# Patient Record
Sex: FEMALE | Race: BLACK OR AFRICAN AMERICAN | Employment: OTHER | URBAN - METROPOLITAN AREA
[De-identification: names, ages, dates, MRNs, and addresses within clinical notes are randomized per-mention and may not be internally consistent; named-entity substitution may affect disease eponyms.]

---

## 2019-03-20 ENCOUNTER — APPOINTMENT (OUTPATIENT)
Dept: PHYSICAL THERAPY | Age: 69
End: 2019-03-20

## 2019-04-01 ENCOUNTER — HOSPITAL ENCOUNTER (OUTPATIENT)
Dept: PHYSICAL THERAPY | Age: 69
Discharge: HOME | End: 2019-04-01
Payer: MEDICARE

## 2019-04-01 PROCEDURE — 97162 PT EVAL MOD COMPLEX 30 MIN: CPT

## 2019-04-01 PROCEDURE — 97110 THERAPEUTIC EXERCISES: CPT

## 2019-04-01 NOTE — PROGRESS NOTES
[address] PHYSICAL THERAPY 
[address] [place] [address] 51, [address] 201,Jessica Foley, [address] - Phone: [phone]  Fax: [phone] PLAN OF CARE / STATEMENT OF MEDICAL NECESSITY FOR PHYSICAL THERAPY SERVICES Patient Name: Stephanie Burks : 1950 Medical  
Diagnosis: LBP, Spondylolisthesis, Stenosis, Arthrodesis Treatment Diagnosis: Low back pain [M54.5] Onset Date: Chronic, Increase 2-3 months prior to PT eval    
Referral Source: Winnie Bass MD Start of Care [facility]): 2019 Prior Hospitalization: See medical history Provider #: [id number] Prior Level of Function: Complete prolonged amb >15 minutes, lie supine, sit without back support and carry/lift objects with increased efficiency Comorbidities: Fall Risk, L/S Fusion , L RCR , Hysterectomy , R Wrist Surgery , Trigger finger , B Bunionectomy , HTN, Depression, Asthma Medications: Verified on Patient Summary List  
The Plan of Care and following information is based on the information from the initial evaluation.  
=========================================================================================== Assessment / key information:  Pt is a 69 y/o female reporting chronic LBP that started  while in the [facility]. Pt reports turning quickly and experiencing LBP. 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 Pt reports in  she had l/s fusion with ana placement. Pt started going to a chiropractor from 2018 through 2018, but experienced increased pain with manipulations to l/s, so she stopped. Pt saw MD 2-3 months ago due to increased back pain and was told she has a disc issue above her fusion. Pt reports pain levels range 5-10/10.  Pain increases with amb > 15 minutes, lying in supine with legs extended, sitting without back support, and lifting/carrying objects; decreases with exercising in pool and meds. Pt reports constant LE radicular pain L>R LE from glute/hips to toes (varying in intensity). Pt reports constant numbness in B ankles and feet. Pt reports legs intermittently feel like they are going to give out (approximately 2x/week), but she hasn't fallen. Pt does not want to use an A device with amb. Pt denies groin pain, falls or red flags. Pt reports she is currently under a lifting restriction of approximately 10 lbs. Pt demonstrates decreased trunk rotation/arm swing with amb and L shoulder elevated, (+) B Trendelenberg,  B LE sensation intact and symmetrical to light touch, (-) SLR, 90/90 SLR and Homans, l/s AROM WFL s/p fusion, decreased B hip ABD/ext/glute strength, 4+/5 B quad strength, 4/5 B HS strength, decreased core strength/stability, poor body mechanics with transfers, TTP B l/s paraspinals, lower t/s paraspinals, glute med/max, piriformis, QL, TFL and proximately 1/3 ITB, even innominates, hip AROM WFL, good scar mobility, and decreased functional mobility. FOTO Score: 39/100 Pt's PT script indicated aquatic PT. Pt informed prior to PT eval that she would not be able to complete aquatic PT at this facility due to insurance restrictions. Another PT location was offered to patient, where she could complete aquatic PT. Pt requested to stay at [facility]. and complete land based PT. 
=========================================================================================== Eval Complexity: History MEDIUM  Complexity : 1-2 comorbidities / personal factors will impact the outcome/ POC ;  Examination  HIGH Complexity : 4+ Standardized tests and measures addressing body structure, function, activity limitation and / or participation in recreation ; Presentation MEDIUM Complexity : Evolving with changing characteristics ;   Decision Making MEDIUM Complexity : FOTO score of 26-74; Overall Complexity MEDIUM Problem List: pain affecting function, decrease strength, impaired gait/ balance, decrease ADL/ functional abilitiies, decrease activity tolerance, decrease flexibility/ joint mobility and decrease transfer abilities Treatment Plan may include any combination of the following: Therapeutic exercise, Therapeutic activities, Neuromuscular re-education, Physical agent/modality, Gait/balance training, Manual therapy, Patient education, Self Care training, Functional mobility training, Home safety training and Stair training Patient / Family readiness to learn indicated by: asking questions, trying to perform skills and interest 
Persons(s) to be included in education: patient (P) Barriers to Learning/Limitations: None Measures taken, if barriers to learning:   
Patient Goal (s): \"Pain relief and what exercises that can relieve some pain\" Patient self reported health status: good Rehabilitation Potential: good ? Short Term Goals: To be accomplished in  2  weeks: 1. Pt to demonstrate independence with HEP to improve core/glute/hip strength/stability for prolonged amb and carrying/lifting objects. 2. Pt to demonstrate correct body mechanics with supine to sit and sit to stand transfers to protect l/s and improve safety and efficiency of transfers at home. ? Long Term Goals: To be accomplished in  4-6  weeks: 1. Pt to report 6 point or > improvement on FOTO scores to improve functional mobility for prolonged amb and carrying/lifting objects. 2. Pt to demonstrate 4+/5 or > B hip ext/ABD strength to improve stability for prolonged amb. 3. Pt to report +5 or > on GROC to improve functional mobility for prolonged amb and carrying/lifting objects. Frequency / Duration:   Patient to be seen  2  times per week for 4-6  weeks: 
Patient / Caregiver education and instruction: self care, activity modification and exercises Therapist Signature: Rivera AlfredoLINDSEY Date: 4/1/2019 Certification Period: 4/1/19 to 6/25/19 Time: 9:59 AM  
=========================================================================================== I certify that the above Physical Therapy Services are being furnished while the patient is under my care. I agree with the treatment plan and certify that this therapy is necessary. Physician Signature:       Date:      Time:  Please sign and return to [facility] [place]. or you may fax the signed copy to [phone]. Thank you.

## 2019-04-01 NOTE — PROGRESS NOTES
PHYSICAL THERAPY - DAILY TREATMENT NOTE Patient Name: Deborah Muñoz        Date: 2019 : 1950   yes Patient  Verified Visit #:      12  Insurance: Payor: Joseph Solders / Plan: Meghan Apple / Product Type: Medicare / In time: 9:00 Out time: 9:59 Total Treatment Time: 61 Medicare/BCBS Time Tracking (below) Total Timed Codes (min):  15 1:1 Treatment Time:  61 TREATMENT AREA =  Low back pain [M54.5] SUBJECTIVE Pain Level (on 0 to 10 scale):  8  / 10 Medication Changes/New allergies or changes in medical history, any new surgeries or procedures?    no  If yes, update Summary List  
Subjective Functional Status/Changes:  []  No changes reported See initial eval  
 
  
 
OBJECTIVE 15 min Therapeutic Exercise:  [x]  See flow sheet Rationale:      increase strength to improve the patients ability to complete amb and carry/lift objects. Billed With/As: 
 [x] TE 
 [] TA 
 [] Neuro 
 [] Self Care Patient Education: [x] Review HEP [] Progressed/Changed HEP based on:  
[] positioning   [] body mechanics   [] transfers   [] heat/ice application   
[x] other: Pt instructed on and given HEP to be completed daily. Pt instructed on proper body mechanics with transfers. Pt educated on red flags and to seek immediate medical attention/911 if those occur. Reviewed POC and goals. Pt reports understanding. Other Objective/Functional Measures: 
 
See initial eval 
  
Post Treatment Pain Level (on 0 to 10) scale:   0  / 10 ASSESSMENT Assessment/Changes in Function:  
 
See initial eval 
  
[]  See Progress Note/Recertification Patient will continue to benefit from skilled PT services to see initial eval  
Progress toward goals / Updated goals: 
Pt denied sharp pains or red flags with initial eval or therapeutic ex. Pt denied pain at end of session. See initial eval.  
 
PLAN [x]  Upgrade activities as tolerated yes Continue plan of care  
[]  Discharge due to :   
 [x]  Other: PT 2x/week for 4-6 weeks. Pt to complete land based PT (pt informed prior to PT eval that we could not complete aquatic PT at our facility due to her insurance. PT offered pt going to [facility] as an option, but pt refused, stating she wanted to complete land based PT at this facility. Therapist: Pedrito Mallory, PT Date: 4/1/2019 Time: 9:59 AM  
 
Future Appointments Date Time Provider Adriana Landa 4/5/2019  8:30 AM Michael Tam, PT [facility]  
4/11/2019  7:30 AM Kristopher Cordoba, PT [facility]  
4/16/2019  9:30 AM Michael Tam, PT [facility]  
4/19/2019  7:30 AM Michael Tam, PT [facility]  
4/23/2019  7:30 AM Michael Tam, PT [facility]  
4/25/2019  8:00 AM Michael Tam, PT [facility]  
4/30/2019  8:30 AM Martita Haines, PT [facility]

## 2019-04-05 ENCOUNTER — HOSPITAL ENCOUNTER (OUTPATIENT)
Dept: PHYSICAL THERAPY | Age: 69
Discharge: HOME | End: 2019-04-05
Payer: MEDICARE

## 2019-04-05 PROCEDURE — 97110 THERAPEUTIC EXERCISES: CPT

## 2019-04-05 PROCEDURE — 97140 MANUAL THERAPY 1/> REGIONS: CPT

## 2019-04-05 NOTE — PROGRESS NOTES
PHYSICAL THERAPY - DAILY TREATMENT NOTE Patient Name: Viry Carey        Date: 2019 : 1950   yes Patient  Verified Visit #:     Insurance: Payor: Karlie Faria / Plan: [place] MEDICARE PART A & B / Product Type: Medicare / In time: 832 Out time: 547 Total Treatment Time: 50 Medicare/BCBS Time Tracking (below) Total Timed Codes (min):  40 1:1 Treatment Time:  30 TREATMENT AREA =  Low back pain [M54.5] SUBJECTIVE Pain Level (on 0 to 10 scale):  3  / 10 Medication Changes/New allergies or changes in medical history, any new surgeries or procedures?    no  If yes, update Summary List  
Subjective Functional Status/Changes:  []  No changes reported Patient reports she was compliant with her home exercises, but felt like these exercises increased her pain in her lower back as well as down both legs. Patient was unsure if she should continue doing them OBJECTIVE Modalities Rationale:     decrease pain and increase tissue extensibility to improve patient's ability to perform transfers. min [] Estim, type/location:   
                                 []  att     []  unatt     []  w/US     []  w/ice    []  w/heat 
 min []  Mechanical Traction: type/lbs   
               []  pro   []  sup   []  int   []  cont    []  before manual    []  after manual  
 min []  Ultrasound, settings/location:    
 min []  Iontophoresis w/ dexamethasone, location:   
                                           []  take home patch       []  in clinic  
10 min []  Ice     [x]  Heat    location/position: To lumbar spine in supine with bolster  
 min []  Vasopneumatic Device, press/temp:   
 min []  Other:   
[x] Skin assessment post-treatment (if applicable):   
[x]  intact    []  redness- no adverse reaction    
[]redness  adverse reaction:     
20 min Therapeutic Exercise:  [x]  See flow sheet Rationale:      increase ROM and increase strength to improve the patients ability to perform walking activities. 20 min Manual Therapy: STM to R QL, lumbar paraspinals and glute med in L SL; STM to L QL, lumbar paraspinals and glue med in R SL  
Rationale:      decrease pain, increase ROM, increase tissue extensibility and decrease trigger points to improve patient's ability to perform standing activities. Billed With/As: 
 [x] TE 
 [] TA 
 [] Neuro 
 [] Self Care Patient Education: [x] Review HEP [] Progressed/Changed HEP based on:  
[] positioning   [] body mechanics   [] transfers   [] heat/ice application   
[] other:   
Other Objective/Functional Measures: 
 
1:1 TE 10' Performed MT in SL positions as patient stated she was unable to lay prone Added SL hip abduction this session, otherwise kept exercises which were part of HEP and did not progress secondary to patient reports of elevated pain Patient required cuing Post Treatment Pain Level (on 0 to 10) scale:   2-3  / 10 ASSESSMENT Assessment/Changes in Function:  
 
Patient denied changes in symptoms post session. Educated on rationale behind elevated symptoms with performance of new exercises, but that her pain should not significantly increase with her current program. Patient acknowledged understanding. []  See Progress Note/Recertification Patient will continue to benefit from skilled PT services to modify and progress therapeutic interventions, address functional mobility deficits, address ROM deficits, address strength deficits, analyze and address soft tissue restrictions, analyze and cue movement patterns, analyze and modify body mechanics/ergonomics and assess and modify postural abnormalities to attain remaining goals. Progress toward goals / Updated goals: 
Progressing with STG#1-reports performance of HEP since her IE  
 
PLAN [x]  Upgrade activities as tolerated yes Continue plan of care  
[]  Discharge due to :   
[]  Other:   
 
Therapist: Debbie Vazquez, PT   
 Date: 4/5/2019 Time: 9:33 AM  
 
Future Appointments Date Time Provider Adriana Landa 4/11/2019  7:30 AM Oneida Cordoba, PT [facility]  
4/16/2019  9:30 AM Angelia Vasquez, PT [facility]  
4/19/2019  7:30 AM Angelia Vasquez, PT [facility]  
4/23/2019  7:30 AM Angelia Vasquez, PT [facility]  
4/25/2019  8:00 AM Angelia Vasquez, PT [facility]  
4/30/2019  8:30 AM Minnie Haines, PT [facility]

## 2019-04-12 ENCOUNTER — HOSPITAL ENCOUNTER (OUTPATIENT)
Dept: PHYSICAL THERAPY | Age: 69
Discharge: HOME | End: 2019-04-12
Payer: MEDICARE

## 2019-04-12 PROCEDURE — 97110 THERAPEUTIC EXERCISES: CPT

## 2019-04-12 PROCEDURE — 97140 MANUAL THERAPY 1/> REGIONS: CPT

## 2019-04-12 NOTE — PROGRESS NOTES
PHYSICAL THERAPY - DAILY TREATMENT NOTE Patient Name: Rachel Solomon        Date: 2019 : 1950   yes Patient  Verified Visit #:   3   of   12  Insurance: Payor: Shady Briscoe / Plan: Meghan Apple / Product Type: Medicare / In time: 804 Out time: 900 Total Treatment Time: 64 Medicare/BCBS Time Tracking (below) Total Timed Codes (min):  46 1:1 Treatment Time:  30 TREATMENT AREA =  Low back pain [M54.5] SUBJECTIVE Pain Level (on 0 to 10 scale):  7  / 10 Medication Changes/New allergies or changes in medical history, any new surgeries or procedures?    no  If yes, update Summary List  
Subjective Functional Status/Changes:  []  No changes reported Patient reports she missed yesterday's appointment because she thought it was today. Patient continues to experience pain into her lumbar spine as well as into both hips on a regular basis. OBJECTIVE Modalities Rationale:     decrease pain and increase tissue extensibility to improve patient's ability to perform transfers. min [] Estim, type/location:   
                                 []  att     []  unatt     []  w/US     []  w/ice    []  w/heat 
 min []  Mechanical Traction: type/lbs   
               []  pro   []  sup   []  int   []  cont    []  before manual    []  after manual  
 min []  Ultrasound, settings/location:    
 min []  Iontophoresis w/ dexamethasone, location:   
                                           []  take home patch       []  in clinic  
10 min []  Ice     [x]  Heat    location/position: To lumbar spine in supine with bolster  
 min []  Vasopneumatic Device, press/temp:   
 min []  Other:   
[x] Skin assessment post-treatment (if applicable):   
[x]  intact    []  redness- no adverse reaction    
[]redness  adverse reaction:     
26 min Therapeutic Exercise:  [x]  See flow sheet Rationale:      increase ROM and increase strength to improve the patients ability to perform walking activities. 20 min Manual Therapy: STM to R QL, lumbar paraspinals and glute med in L SL; STM to L QL, lumbar paraspinals and glue med in R SL  
Rationale:      decrease pain, increase ROM, increase tissue extensibility and decrease trigger points to improve patient's ability to perform standing activities. Billed With/As: 
 [x] TE 
 [] TA 
 [] Neuro 
 [] Self Care Patient Education: [x] Review HEP [] Progressed/Changed HEP based on:  
[] positioning   [] body mechanics   [] transfers   [] heat/ice application   
[] other:   
Other Objective/Functional Measures: 
 
1:1 TE 10' Tenderness noted to R>L QL during MT with referral of pain down R thigh with palpation of R QL Added t-band rows this session for improved core and upper thoracic strength and stability Post Treatment Pain Level (on 0 to 10) scale:   7 / 10 ASSESSMENT Assessment/Changes in Function:  
 
Patient denied changes in symptoms post session. Patient educated to continue with HEP as prescribed, will progress at NV depending on patient symptoms level at start of session. []  See Progress Note/Recertification Patient will continue to benefit from skilled PT services to modify and progress therapeutic interventions, address functional mobility deficits, address ROM deficits, address strength deficits, analyze and address soft tissue restrictions, analyze and cue movement patterns, analyze and modify body mechanics/ergonomics and assess and modify postural abnormalities to attain remaining goals. Progress toward goals / Updated goals: 
Slow progress with LTG#2- strength gains are reduced based upon attendance at this time. PLAN [x]  Upgrade activities as tolerated yes Continue plan of care  
[]  Discharge due to :   
[]  Other:   
 
Therapist: Jose Gillespie PT Date: 4/12/2019 Time: 9:39 AM  
 
Future Appointments Date Time Provider Adriana Landa 4/23/2019  7:30 AM Brayden Valdez, PT [facility]  
4/25/2019  8:00 AM Brayden Valdez, PT [facility]  
4/30/2019  8:30 AM Omer Haines, PT [facility]

## 2019-04-16 ENCOUNTER — APPOINTMENT (OUTPATIENT)
Dept: PHYSICAL THERAPY | Age: 69
End: 2019-04-16
Payer: MEDICARE

## 2019-04-19 ENCOUNTER — APPOINTMENT (OUTPATIENT)
Dept: PHYSICAL THERAPY | Age: 69
End: 2019-04-19
Payer: MEDICARE

## 2019-04-23 ENCOUNTER — APPOINTMENT (OUTPATIENT)
Dept: PHYSICAL THERAPY | Age: 69
End: 2019-04-23
Payer: MEDICARE

## 2019-04-25 ENCOUNTER — HOSPITAL ENCOUNTER (OUTPATIENT)
Dept: PHYSICAL THERAPY | Age: 69
Discharge: HOME | End: 2019-04-25
Payer: MEDICARE

## 2019-04-25 PROCEDURE — 97140 MANUAL THERAPY 1/> REGIONS: CPT

## 2019-04-25 PROCEDURE — 97110 THERAPEUTIC EXERCISES: CPT

## 2019-04-25 NOTE — PROGRESS NOTES
PHYSICAL THERAPY - DAILY TREATMENT NOTE Patient Name: Leonel Stephenson        Date: 2019 : 1950   yes Patient  Verified Visit #:     Insurance: Payor: Magdi Honey / Plan: Meghan Apple / Product Type: Medicare / In time: 805 Out time: 264 Total Treatment Time: 47 Medicare/[facility] Time Tracking (below) Total Timed Codes (min):  44 1:1 Treatment Time:  45 TREATMENT AREA =  Low back pain [M54.5] SUBJECTIVE Pain Level (on 0 to 10 scale):  1  / 10 Medication Changes/New allergies or changes in medical history, any new surgeries or procedures?    no  If yes, update Summary List  
Subjective Functional Status/Changes:  []  No changes reported Patient arrives to clinic with a lower back brace asking if it is something she should be using. Patient states she is doing better today than normal and isn't feeling too much pain in her lower back, just discomfort into her hips. OBJECTIVE Modalities Rationale:     decrease pain and increase tissue extensibility to improve patient's ability to perform transfers. min [] Estim, type/location:   
                                 []  att     []  unatt     []  w/US     []  w/ice    []  w/heat 
 min []  Mechanical Traction: type/lbs   
               []  pro   []  sup   []  int   []  cont    []  before manual    []  after manual  
 min []  Ultrasound, settings/location:    
 min []  Iontophoresis w/ dexamethasone, location:   
                                           []  take home patch       []  in clinic  
10 min []  Ice     [x]  Heat    location/position: To lumbar spine in supine with bolster  
 min []  Vasopneumatic Device, press/temp:   
 min []  Other:   
[x] Skin assessment post-treatment (if applicable):   
[x]  intact    []  redness- no adverse reaction    
[]redness  adverse reaction:     
39 min Therapeutic Exercise:  [x]  See flow sheet Rationale:      increase ROM and increase strength to improve the patients ability to perform walking activities. 15 min Manual Therapy: STM to B glute max and B piriformis in prone Rationale:      decrease pain, increase ROM, increase tissue extensibility and decrease trigger points to improve patient's ability to perform standing activities. Billed With/As: 
 [x] TE 
 [] TA 
 [] Neuro 
 [] Self Care Patient Education: [x] Review HEP [] Progressed/Changed HEP based on:  
[] positioning   [] body mechanics   [] transfers   [] heat/ice application   
[] other:   
Other Objective/Functional Measures: 
 
1:1 TE 21' Added several standing TE this session for improved core and hip strength/stability Patient requiring cuing and supervision throughout session in order to ensure proper technique Patient reporting difficulty with most TE despite general low level of difficulty, in particular table exercises Post Treatment Pain Level (on 0 to 10) scale:   7-8  / 10 ASSESSMENT Assessment/Changes in Function:  
 
Patient reported a significant increase in pain levels post session, however overall presentation did not much these pain levels. Progress is slow at this time secondary to patient lack of attendance since her IE as well as likely reduced compliance with HEP. Educated patient on proper lumbar brace use and importance of performing exercise program in order to improve core strength. []  See Progress Note/Recertification Patient will continue to benefit from skilled PT services to modify and progress therapeutic interventions, address functional mobility deficits, address ROM deficits, address strength deficits, analyze and address soft tissue restrictions, analyze and cue movement patterns, analyze and modify body mechanics/ergonomics and assess and modify postural abnormalities to attain remaining goals.  
Progress toward goals / Updated goals: 
Slow progress with compliance to HEP  
 
PLAN 
 [x]  Upgrade activities as tolerated yes Continue plan of care  
[]  Discharge due to :   
[]  Other:   
 
Therapist: Gabi Antony, PT Date: 4/25/2019 Time: 9:03 AM  
 
Future Appointments Date Time Provider Adriana Landa 4/30/2019  8:30 AM Candy Haines, PT [facility]

## 2019-04-30 ENCOUNTER — HOSPITAL ENCOUNTER (OUTPATIENT)
Dept: PHYSICAL THERAPY | Age: 69
Discharge: HOME | End: 2019-04-30
Payer: MEDICARE

## 2019-04-30 PROCEDURE — 97140 MANUAL THERAPY 1/> REGIONS: CPT

## 2019-04-30 PROCEDURE — 97110 THERAPEUTIC EXERCISES: CPT

## 2019-04-30 NOTE — PROGRESS NOTES
PHYSICAL THERAPY - DAILY TREATMENT NOTE Patient Name: Tad Jimenez        Date: 2019 : 1950   yes Patient  Verified Visit #:     Insurance: Payor: Justa Wurl / Plan: Meghan Apple / Product Type: Medicare / In time: 8:30 Out time: 9:22 Total Treatment Time: 46 Medicare/BCBS Time Tracking (below) Total Timed Codes (min):  42 1:1 Treatment Time:  32 TREATMENT AREA =  Low back pain [M54.5] SUBJECTIVE Pain Level (on 0 to 10 scale):  8  / 10 Medication Changes/New allergies or changes in medical history, any new surgeries or procedures?    no  If yes, update Summary List  
Subjective Functional Status/Changes:  []  No changes reported Pt reports inc lower back pain as a result of doing household chores and grocery shopping this weekend. Reports her pain radiates down the back of both thighs and into the feet occasionally. OBJECTIVE Modalities Rationale:     decrease pain and increase tissue extensibility to improve patient's ability to complete ADLs 
 min [] Estim, type/location:   
                                 []  att     []  unatt     []  w/US     []  w/ice    []  w/heat 
 min []  Mechanical Traction: type/lbs   
               []  pro   []  sup   []  int   []  cont    []  before manual    []  after manual  
 min []  Ultrasound, settings/location:    
 min []  Iontophoresis w/ dexamethasone, location:   
                                           []  take home patch       []  in clinic  
10 min []  Ice     [x]  Heat    location/position: To l/s in supine w/ bolster  
 min []  Vasopneumatic Device, press/temp:   
 min []  Other:   
[x] Skin assessment post-treatment (if applicable):   
[x]  intact    []  redness- no adverse reaction    
[]redness  adverse reaction:     
27 min Therapeutic Exercise:  [x]  See flow sheet Rationale:      increase ROM and increase strength to improve the patients ability to ambulate, transfer 15 min Manual Therapy: STM to B glute max and piriformis, gentle sacral ext mob Rationale:      decrease pain, increase ROM, increase tissue extensibility and decrease trigger points to improve patient's ability to transfer Billed With/As: 
 [x] TE 
 [] TA 
 [] Neuro 
 [] Self Care Patient Education: [x] Review HEP [] Progressed/Changed HEP based on:  
[] positioning   [] body mechanics   [] transfers   [] heat/ice application   
[] other:   
Other Objective/Functional Measures: 
 
12' 1:1 TE Pt requiring cueing for appropriate core contraction w/ standing ex Significant ttp to B piri and glute max Dec strength R>L glute med Post Treatment Pain Level (on 0 to 10) scale:   2  / 10 ASSESSMENT Assessment/Changes in Function:  
 
Pt inquired to what exercises to do at gym. Advised cardio on recumbent bike and swimming only at this time, w/ focus on HEP rx by PT 
  
[]  See Progress Note/Recertification Patient will continue to benefit from skilled PT services to modify and progress therapeutic interventions, address functional mobility deficits, address ROM deficits, address strength deficits, analyze and address soft tissue restrictions, analyze and cue movement patterns, analyze and modify body mechanics/ergonomics, assess and modify postural abnormalities and instruct in home and community integration to attain remaining goals. Progress toward goals / Updated goals: 
Pt reporting only partial compliance to HEP, slow progress to STG #1 PLAN 
[]  Upgrade activities as tolerated yes Continue plan of care  
[]  Discharge due to :   
[]  Other:   
 
Therapist: Patricio Ontiveros PT Date: 4/30/2019 Time: 9:04 AM  
 
No future appointments.

## 2019-05-06 ENCOUNTER — HOSPITAL ENCOUNTER (OUTPATIENT)
Dept: PHYSICAL THERAPY | Age: 69
Discharge: HOME | End: 2019-05-06
Payer: MEDICARE

## 2019-05-06 PROCEDURE — 97110 THERAPEUTIC EXERCISES: CPT

## 2019-05-06 PROCEDURE — 97140 MANUAL THERAPY 1/> REGIONS: CPT

## 2019-05-06 NOTE — PROGRESS NOTES
[facility] PHYSICAL THERAPY 
[address] [place] [address] [address], [address],Jessica Foley, [address] [facility] - Phone: [phone]  Fax: [phone] CONTINUED PLAN OF CARE/RECERTIFICATION FOR PHYSICAL THERAPY Patient Name: Jeff Dash : 1950 Treatment/Medical Diagnosis: Low back pain [M54.5] Onset Date: Chronic, Increase 2-3 months prior to PT eval   
Referral Source: Cameron Leyva MD Start of Care [facility]): 19 Prior Hospitalization: See Medical History Provider #: [id number] Prior Level of Function: Complete prolonged amb >15 minutes, lie supine, sit without back support and carry/lift objects with increased efficiency Comorbidities: Fall Risk, L/S Fusion , L RCR , Hysterectomy , R Wrist Surgery , Trigger finger , B Bunionectomy , HTN, Depression, Asthma Medications: Verified on Patient Summary List  
Visits from [facility]: 6 Missed Visits: 2 Goal/Measure of Progress Goal Met? 1.  Pt to report 6 point or > improvement on FOTO scores to improve functional mobility for prolonged amb and carrying/lifting objects Status at last Eval: 39/100 Current Status: 58/100 yes 2. Pt to demonstrate 4+/5 or > B hip ext/ABD strength to improve stability for prolonged amb Status at last Eval: decreased B hip ABD/ext/glute strength Current Status: B hip ext 3-/5, L hip abd 4/5, R hip abd 4-/5 (P!) Slow progress 3. Pt to report +5 or > on GROC to improve functional mobility for prolonged amb and carrying/lifting objects Status at last Eval: n/a Current Status: +4 progressing Key Functional Changes/Progress: Patient reports slight changes in symptoms since the start of PT. Patient states she has noticed some pain reduction into her hips and lower back, however continues to note difficulty with bowling, prolonged walking. Patient rates her pain at worst as a 5/10 and on average 2-3/10.  Additionally, patient continues to note numbness into both feet. Patient did miss about 1/5-2 weeks of PT due to a death in the family, which has contributed to lack of additional progress at this time. Patient would benefit from continued PT in order to further address impairments and help improve tolerance to walking, standing and recreational activities. Problem List: pain affecting function, decrease ROM, decrease strength, impaired gait/ balance, decrease ADL/ functional abilitiies, decrease activity tolerance and decrease flexibility/ joint mobility Treatment Plan may include any combination of the following: Therapeutic exercise, Therapeutic activities, Neuromuscular re-education, Physical agent/modality, Gait/balance training, Manual therapy, Patient education and Functional mobility training Patient Goal(s) has been updated and includes:    
? Goals for this certification period include and are to be achieved in   4  weeks: 1. Patient will demonstrate independence with advanced HEP in preparation for DC. 2. Continue with LTG#2 3. Continue with LTG#3 Frequency / Duration:   Patient to be seen   2   times per week for   4    weeks: 
G-Codes (GP): na 
Assessments/Recommendations: Patient would benefit from continued PT 2x/week for 4 weeks in order to address remaining impairments and functional limitations. If you have any questions/comments please contact us directly at [phone]. Thank you for allowing us to assist in the care of your patient. Therapist Signature: Delia Lerner PT Date: 5/6/2019 Certification Period: 
Reporting Period: 4/1/19-6/25/19 4/1/19-5/6/19 Time: 9:01 AM  
NOTE TO PHYSICIAN:  PLEASE COMPLETE THE ORDERS BELOW AND FAX TO [facility] Physical Therapy: [phone] If you are unable to process this request in 24 hours please contact our office: [phone] 
 
___ I have read the above report and request that my patient continue as recommended. ___ I have read the above report and request that my patient continue therapy with the following changes/special instructions: ________________________________________________  
___ I have read the above report and request that my patient be discharged from therapy.   
 
Physician Signature:       Date:      Time:

## 2019-05-06 NOTE — PROGRESS NOTES
PHYSICAL THERAPY - DAILY TREATMENT NOTE Patient Name: Kim Marie        Date: 2019 : 1950   yes Patient  Verified Visit #:   6   of   12(+8)  Insurance: Payor: [place] [employer] / Plan: [place] MEDICARE PART A & B / Product Type: Medicare / In time: 855 Out time: 1001 Total Treatment Time: 77 Medicare/BCBS Time Tracking (below) Total Timed Codes (min):  56 1:1 Treatment Time:  40 TREATMENT AREA =  Low back pain [M54.5] SUBJECTIVE Pain Level (on 0 to 10 scale):  1  / 10 Medication Changes/New allergies or changes in medical history, any new surgeries or procedures?    no  If yes, update Summary List  
Subjective Functional Status/Changes:  []  No changes reported See PN OBJECTIVE Modalities Rationale:     decrease pain and increase tissue extensibility to improve patient's ability to perform transfers. min [] Estim, type/location:   
                                 []  att     []  unatt     []  w/US     []  w/ice    []  w/heat 
 min []  Mechanical Traction: type/lbs   
               []  pro   []  sup   []  int   []  cont    []  before manual    []  after manual  
 min []  Ultrasound, settings/location:    
 min []  Iontophoresis w/ dexamethasone, location:   
                                           []  take home patch       []  in clinic  
10 min []  Ice     [x]  Heat    location/position: To lumbar spine in supine with bolster  
 min []  Vasopneumatic Device, press/temp:   
 min []  Other:   
[x] Skin assessment post-treatment (if applicable):   
[x]  intact    []  redness- no adverse reaction    
[]redness  adverse reaction:     
46 min Therapeutic Exercise:  [x]  See flow sheet Rationale:      increase ROM and increase strength to improve the patients ability to perform walking activities.    
10 min Manual Therapy: STM to R glute med, R piriformis, R TFL all in L SL  
Rationale:      decrease pain, increase ROM, increase tissue extensibility and decrease trigger points to improve patient's ability to perform standing activities. Billed With/As: 
 [] TE 
 [] TA 
 [] Neuro 
 [] Self Care Patient Education: [x] Review HEP [] Progressed/Changed HEP based on:  
[] positioning   [] body mechanics   [] transfers   [] heat/ice application   
[] other:   
Other Objective/Functional Measures: 
 
1:1 TE 30' See PN Post Treatment Pain Level (on 0 to 10) scale:   2  / 10 ASSESSMENT Assessment/Changes in Function:  
 
See PN 
  
 
PLAN [x]  Upgrade activities as tolerated yes Continue plan of care  
[]  Discharge due to :   
[]  Other:   
 
Therapist: Rona Trejo PT Date: 5/6/2019 Time: 10:09 AM  
 
Future Appointments Date Time Provider Adriana Landa 5/8/2019  9:30 AM Delicia Stephenson PT [facility]  
5/14/2019  8:30 AM Thalia Quinonez, PT [facility]  
5/16/2019 10:00 AM Delicia Stephenson PT [facility]  
5/23/2019  9:30 AM Delicia Stephenson PT [facility]

## 2019-05-08 ENCOUNTER — HOSPITAL ENCOUNTER (OUTPATIENT)
Dept: PHYSICAL THERAPY | Age: 69
Discharge: HOME | End: 2019-05-08
Payer: MEDICARE

## 2019-05-08 PROCEDURE — 97110 THERAPEUTIC EXERCISES: CPT

## 2019-05-08 PROCEDURE — 97140 MANUAL THERAPY 1/> REGIONS: CPT

## 2019-05-08 NOTE — PROGRESS NOTES
PHYSICAL THERAPY - DAILY TREATMENT NOTE Patient Name: Leonel Stephenson        Date: 2019 : 1950   yes Patient  Verified Visit #:     Insurance: Payor: [address] [place] [address] / Plan: Meghan Abbott y / Product Type: Medicare / In time: 9:27 Out time: 10:25 Total Treatment Time: 62 Medicare/BCBS Time Tracking (below) Total Timed Codes (min):  48 1:1 Treatment Time:  48 TREATMENT AREA =  Low back pain [M54.5] SUBJECTIVE Pain Level (on 0 to 10 scale):  3  / 10 Medication Changes/New allergies or changes in medical history, any new surgeries or procedures?    no  If yes, update Summary List  
Subjective Functional Status/Changes:  []  No changes reported Pt reports her back feels good, but her R glute is sore. Pt denies falls or red flags. Pt reports compliance with HEP. OBJECTIVE Modalities Rationale:     decrease inflammation and decrease pain to improve patient's ability to complete amb/standing and transfers. min [] Estim, type/location:   
                                 []  att     []  unatt     []  w/US     []  w/ice    []  w/heat 
 min []  Mechanical Traction: type/lbs   
               []  pro   []  sup   []  int   []  cont    []  before manual    []  after manual  
 min []  Ultrasound, settings/location:    
 min []  Iontophoresis w/ dexamethasone, location:   
                                           []  take home patch       []  in clinic  
10 min [x]  Ice     []  Heat    location/position: Supine to l/s and R glute with LE elevated on wedge  
 min []  Vasopneumatic Device, press/temp:   
 min []  Other:   
[x] Skin assessment post-treatment (if applicable):   
[x]  intact    [x]  no adverse reaction    
[]redness  adverse reaction:     
33 min Therapeutic Exercise:  [x]  See flow sheet Rationale:      increase strength and increase proprioception to improve the patients ability to complete amb/standing, transfers. 15 min Manual Therapy: STM to R glute max/med, piriformis and TFL in L side-lying with pillow between knees Rationale:      decrease pain and decrease trigger points to improve patient's ability to complete amb/standing, transfers. Billed With/As: 
 [x] TE 
 [] TA 
 [] Neuro 
 [] Self Care Patient Education: [x] Review HEP [] Progressed/Changed HEP based on:  
[] positioning   [] body mechanics   [] transfers   [] heat/ice application   
[] other:   
Other Objective/Functional Measures: Added: SLS to improve core/LE stability Post Treatment Pain Level (on 0 to 10) scale:   1  / 10 ASSESSMENT Assessment/Changes in Function:  
 
Pt denied sharp pains or red flags with therapeutic ex. Pt reported an improvement in pain/sxs at end of session. []  See Progress Note/Recertification Patient will continue to benefit from skilled PT services to modify and progress therapeutic interventions, address functional mobility deficits, address strength deficits, analyze and address soft tissue restrictions, analyze and cue movement patterns and analyze and modify body mechanics/ergonomics to attain remaining goals. Progress toward goals / Updated goals: 
Pt continues to be challenged by SL hip ABD (requires cues to avoid compensations and improve mechanics) - pt has not met LTG #2 at this time. PLAN [x]  Upgrade activities as tolerated yes Continue plan of care  
[]  Discharge due to :   
[]  Other:   
 
Therapist: Jay Chou PT Date: 5/8/2019 Time: 9:29 AM  
 
Future Appointments Date Time Provider Adriana Landa 5/8/2019  9:30 AM Donna Schmitz, PT [facility]  
5/14/2019  8:30 AM Malcolm Lr, PT [facility]  
5/16/2019 10:00 AM Donna Schmitz PT [facility]  
5/23/2019  9:30 AM Donna Schmitz, PT [facility]

## 2019-05-14 ENCOUNTER — HOSPITAL ENCOUNTER (OUTPATIENT)
Dept: PHYSICAL THERAPY | Age: 69
Discharge: HOME | End: 2019-05-14
Payer: MEDICARE

## 2019-05-14 ENCOUNTER — APPOINTMENT (OUTPATIENT)
Dept: PHYSICAL THERAPY | Age: 69
End: 2019-05-14
Payer: MEDICARE

## 2019-05-14 PROCEDURE — 97110 THERAPEUTIC EXERCISES: CPT

## 2019-05-14 PROCEDURE — 97140 MANUAL THERAPY 1/> REGIONS: CPT

## 2019-05-14 NOTE — PROGRESS NOTES
PHYSICAL THERAPY - DAILY TREATMENT NOTE Patient Name: Arelsi Aragon        Date: 2019 : 1950   yes Patient  Verified Visit #:   8      20  Insurance: Payor: Ese Rolon / Plan: Meghan Apple / Product Type: Medicare / In time: 830 Out time: 926 Total Treatment Time: 64 Medicare/BCBS Time Tracking (below) Total Timed Codes (min):  46 1:1 Treatment Time:  25 TREATMENT AREA =  Low back pain [M54.5] SUBJECTIVE Pain Level (on 0 to 10 scale):  2  / 10 Medication Changes/New allergies or changes in medical history, any new surgeries or procedures?    no  If yes, update Summary List  
Subjective Functional Status/Changes:  []  No changes reported Patient reports her R hip and outer thigh are sore today, but overall she is feeling well. Patient states her symptoms increase with prolonged walking or standing. OBJECTIVE Modalities Rationale:     decrease inflammation and decrease pain to improve patient's ability to perform transfers. min [] Estim, type/location:   
                                 []  att     []  unatt     []  w/US     []  w/ice    []  w/heat 
 min []  Mechanical Traction: type/lbs   
               []  pro   []  sup   []  int   []  cont    []  before manual    []  after manual  
 min []  Ultrasound, settings/location:    
 min []  Iontophoresis w/ dexamethasone, location:   
                                           []  take home patch       []  in clinic  
10 min [x]  Ice     []  Heat    location/position: To lumbar spine in supine with bolster  
 min []  Vasopneumatic Device, press/temp:   
 min []  Other:   
[x] Skin assessment post-treatment (if applicable):   
[x]  intact    []  redness- no adverse reaction    
[]redness  adverse reaction:     
34 min Therapeutic Exercise:  [x]  See flow sheet Rationale:      increase ROM and increase strength to improve the patients ability to perform walking activities. 12 min Manual Therapy: STM to R glute med, R piriformis, R ITB all in L SL position Rationale:      decrease pain, increase ROM, increase tissue extensibility and decrease trigger points to improve patient's ability to perform standing activities. Billed With/As: 
 [x] TE 
 [] TA 
 [] Neuro 
 [] Self Care Patient Education: [x] Review HEP [] Progressed/Changed HEP based on:  
[] positioning   [] body mechanics   [] transfers   [] heat/ice application   
[] other:   
Other Objective/Functional Measures: 
 
1:1 TE 15' Cued patient on LE positioning during standing hip abduction in order to promote proper glute muscle activation Increased repetitions of 1/2 prone hip ext to 15 repetitions Post Treatment Pain Level (on 0 to 10) scale:   2  / 10 ASSESSMENT Assessment/Changes in Function:  
 
Patient denied changes in symptoms post session. Patient demonstrating improved tolerance to TE program indicating improvements in strength and endurance. []  See Progress Note/Recertification Patient will continue to benefit from skilled PT services to modify and progress therapeutic interventions, address functional mobility deficits, address ROM deficits, address strength deficits, analyze and address soft tissue restrictions, analyze and cue movement patterns, analyze and modify body mechanics/ergonomics and assess and modify postural abnormalities to attain remaining goals. Progress toward goals / Updated goals: 
Progressing with LTG#3 per performance in PT  
 
PLAN [x]  Upgrade activities as tolerated yes Continue plan of care  
[]  Discharge due to :   
[]  Other:   
 
Therapist: Rafael Beaulieu PT Date: 5/14/2019 Time: 10:10 AM  
 
Future Appointments Date Time Provider Adriana Landa 5/16/2019 10:00 AM Hussein Almonte PT [facility]  
5/23/2019  9:30 AM Hussein Almonte PT [facility]

## 2019-05-16 ENCOUNTER — APPOINTMENT (OUTPATIENT)
Dept: PHYSICAL THERAPY | Age: 69
End: 2019-05-16
Payer: MEDICARE

## 2019-05-23 ENCOUNTER — APPOINTMENT (OUTPATIENT)
Dept: PHYSICAL THERAPY | Age: 69
End: 2019-05-23
Payer: MEDICARE

## 2019-08-08 NOTE — PROGRESS NOTES
[address] PHYSICAL THERAPY  [address] [place] [address] [address], [address],Jessica Foley, [address] [facility] [address] - Phone: [phone]  Fax: [phone] FOR PHYSICAL THERAPY          Patient Name: Rachel Solomon : 1950   Treatment/Medical Diagnosis: Low back pain [M54.5]   Onset Date: Chronic    Referral Source: Cheryl Guallpa MD Start of Care [facility]): 19   Prior Hospitalization: See Medical History Provider #: [id number]   Prior Level of Function: Complete prolonged amb >15 minutes, lie supine, sit without back support and carry/lift objects with increased efficiency   Comorbidities:  Fall Risk, L/S Fusion , L RCR , Hysterectomy , R Wrist Surgery , Trigger finger , B Bunionectomy , HTN, Depression, Asthma        Medications: Verified on Patient Summary List   Visits from [facility]: 8 Missed Visits: 3   es/Progress: Patient attended 2 follow up appointments after her last progress note on 19. Objective measurements were not re-assessed beyond this date. At this time, patient will be discharged from PT.     G-Codes (GP): na  Assessments/Recommendations: Discontinue therapy due to lack of attendance or compliance. If you have any questions/comments please contact us directly at [phone]. Thank you for allowing us to assist in the care of your patient. Therapist Signature:  Rosey Wang PT Date: 19   Reporting Period: 19-19 Time: 6:32 PM